# Patient Record
Sex: FEMALE | Race: WHITE | URBAN - METROPOLITAN AREA
[De-identification: names, ages, dates, MRNs, and addresses within clinical notes are randomized per-mention and may not be internally consistent; named-entity substitution may affect disease eponyms.]

---

## 2018-08-17 ENCOUNTER — OFFICE VISIT (OUTPATIENT)
Dept: PLASTIC SURGERY | Facility: CLINIC | Age: 73
End: 2018-08-17
Payer: COMMERCIAL

## 2018-08-17 VITALS — WEIGHT: 131 LBS | BODY MASS INDEX: 21.05 KG/M2 | HEIGHT: 66 IN

## 2018-08-17 DIAGNOSIS — L98.9 SKIN LESION: Primary | ICD-10-CM

## 2018-08-17 DIAGNOSIS — L98.9 SKIN LESIONS: ICD-10-CM

## 2018-08-17 PROCEDURE — 99202 OFFICE O/P NEW SF 15 MIN: CPT | Performed by: SURGERY

## 2018-08-17 PROCEDURE — 88305 TISSUE EXAM BY PATHOLOGIST: CPT | Performed by: PATHOLOGY

## 2018-08-17 RX ORDER — BUPROPION HYDROCHLORIDE 300 MG/1
300 TABLET ORAL DAILY
COMMUNITY

## 2018-08-17 RX ORDER — B-COMPLEX WITH VITAMIN C
TABLET ORAL
COMMUNITY

## 2018-08-17 RX ORDER — HYDROXYCHLOROQUINE SULFATE 200 MG/1
200 TABLET, FILM COATED ORAL 2 TIMES DAILY WITH MEALS
COMMUNITY

## 2018-08-17 RX ORDER — IPRATROPIUM BROMIDE 42 UG/1
2 SPRAY, METERED NASAL 4 TIMES DAILY
COMMUNITY

## 2018-08-17 RX ORDER — CHLORPHENIRAMINE MALEATE 4 MG/1
4 TABLET ORAL EVERY 6 HOURS PRN
COMMUNITY

## 2018-08-17 RX ORDER — RIBOFLAVIN (VITAMIN B2) 100 MG
100 TABLET ORAL DAILY
COMMUNITY

## 2018-08-17 RX ORDER — BETAMETHASONE DIPROPIONATE 0.5 MG/G
CREAM TOPICAL 2 TIMES DAILY
COMMUNITY

## 2018-08-17 RX ORDER — ANASTROZOLE 1 MG/1
1 TABLET ORAL DAILY
COMMUNITY

## 2018-08-17 RX ORDER — LUTEIN 10 MG
TABLET ORAL
COMMUNITY

## 2018-08-17 RX ORDER — ZEAXANTHIN 90 %
POWDER (GRAM) MISCELLANEOUS
COMMUNITY

## 2018-08-17 RX ORDER — UREA 10 %
1 LOTION (ML) TOPICAL DAILY
COMMUNITY

## 2018-08-17 NOTE — PROGRESS NOTES
Assessment/Plan:  Please see HPI  She has a persistent lesion on the right superolateral brow, is scaly, somewhat ulcerated, and is been present for several months  She also has a similar, smaller appearing area of the mid dorsum of the nose  She would prefer to avoid biopsying the nose, at least for the time being, she is interested in undergoing biopsy of the brow lesion  After injecting xylocaine with epinephrine, and prepping the skin with alcohol, The brow was biopsied utilizing a 3 mm punch, the area was closed with a 6 0 nylon  She will be seen in 1 week for suture removal  She understands that the toe should be biopsied if it does not heal, persists, or worsens  She will continue to follow-up with Dr Candido King  at [id number] [facility]  Diagnoses and all orders for this visit:    Skin lesion  -     Tissue Exam    Other orders  -     anastrozole (ARIMIDEX) 1 mg tablet; Take 1 mg by mouth daily  -     betamethasone dipropionate (DIPROSONE) 0 05 % cream; Apply topically 2 (two) times a day  -     buPROPion (WELLBUTRIN XL) 300 mg 24 hr tablet; Take 300 mg by mouth daily  -     calcium carbonate (OS-GO) 1250 (500 Ca) MG chewable tablet; Chew 1 tablet daily  -     chlorpheniramine (CHLOR-TRIMETON) 4 MG tablet; Take 4 mg by mouth every 6 (six) hours as needed for allergies  -     Cholecalciferol 65653 units CAPS; Take by mouth  -     DENOSUMAB SC; Inject under the skin  -     hydroxychloroquine (PLAQUENIL) 200 mg tablet; Take 200 mg by mouth 2 (two) times a day with meals  -     ipratropium (ATROVENT) 0 06 % nasal spray; 2 sprays into each nostril 4 (four) times a day  -     Ascorbic Acid (VITAMIN C) 100 MG tablet; Take 100 mg by mouth daily  -     vitamin E 100 UNIT capsule; Take 100 Units by mouth daily  -     Copper 5 MG CAPS;  Take by mouth  -     Lutein 10 MG TABS; Take by mouth  -     Zeaxanthin POWD; by Does not apply route  -     Zinc 100 MG TABS; Take by mouth          Subjective:  Lesions of superolateral right brow and nasal dorsum     Patient ID: Didi Mancini is a 81 y o  female  HPI she continues to be followed by Dr Dale Bush at  [address] [facility], is being treated for cutaneous lupus  She has lesions of the right superolateral brow and nasal dorsum which have persisted over several months she is here today to discuss options for biopsy  The following portions of the patient's history were reviewed and updated as appropriate: allergies, current medications, past family history, past medical history, past social history, past surgical history and problem list     Review of Systems   Constitutional: Negative for chills and fever  HENT: Negative for hearing loss  Eyes: Positive for visual disturbance  Negative for discharge  Respiratory: Negative for chest tightness and shortness of breath  Cardiovascular: Negative for chest pain and leg swelling  Gastrointestinal: Negative for blood in stool, constipation, diarrhea and nausea  Genitourinary: Negative for dysuria  Musculoskeletal: Negative for gait problem  Skin: Negative for rash  Allergic/Immunologic: Negative for immunocompromised state  Neurological: Negative for seizures and headaches  Hematological: Does not bruise/bleed easily  Psychiatric/Behavioral: Positive for dysphoric mood  The patient is not nervous/anxious  Objective:      Ht 5' 6" (1 676 m)   Wt 59 4 kg (131 lb)   BMI 21 14 kg/m²          Physical Exam   Constitutional: She is oriented to person, place, and time  She appears well-developed  HENT:   Head: Normocephalic and atraumatic  Eyes: Pupils are equal, round, and reactive to light  Neck: Normal range of motion  Pulmonary/Chest: Effort normal    Musculoskeletal: Normal range of motion  Neurological: She is alert and oriented to person, place, and time  Skin: Skin is warm     Pink, Scaly/crusted patch right superolateral brow, similar, smaller area left mid nasal dorsum Psychiatric: She has a normal mood and affect

## 2018-08-17 NOTE — LETTER
August 17, 2018     Vignesh Arita DO  [address]  [facility] [place]  [facility] [address]  [address] [facility]    Patient: Cornelio Gordon   YOB: 1945   Date of Visit: 8/17/2018       Dear Dr Ann Villaseñor: Thank you for referring Cristina Gabriel to me for evaluation  Below are my notes for this consultation  If you have questions, please do not hesitate to call me  I look forward to following your patient along with you  Sincerely,        Ashutosh Bhakta MD        CC: No Recipients  Ashutosh Bhakta MD  8/17/2018  2:53 PM  Sign at close encounter  Assessment/Plan:  Please see HPI  She has a persistent lesion on the right superolateral brow, is scaly, somewhat ulcerated, and is been present for several months  She also has a similar, smaller appearing area of the mid dorsum of the nose  She would prefer to avoid biopsying the nose, at least for the time being, she is interested in undergoing biopsy of the brow lesion  After injecting xylocaine with epinephrine, and prepping the skin with alcohol, The brow was biopsied utilizing a 3 mm punch, the area was closed with a 6 0 nylon  She will be seen in 1 week for suture removal  She understands that the toe should be biopsied if it does not heal, persists, or worsens  She will continue to follow-up with Dr Ann Villaseñor  at [address] [facility]  Diagnoses and all orders for this visit:    Skin lesion  -     Tissue Exam    Other orders  -     anastrozole (ARIMIDEX) 1 mg tablet; Take 1 mg by mouth daily  -     betamethasone dipropionate (DIPROSONE) 0 05 % cream; Apply topically 2 (two) times a day  -     buPROPion (WELLBUTRIN XL) 300 mg 24 hr tablet; Take 300 mg by mouth daily  -     calcium carbonate (OS-GO) 1250 (500 Ca) MG chewable tablet; Chew 1 tablet daily  -     chlorpheniramine (CHLOR-TRIMETON) 4 MG tablet; Take 4 mg by mouth every 6 (six) hours as needed for allergies  -     Cholecalciferol 15273 units CAPS;  Take by mouth  - DENOSUMAB SC; Inject under the skin  -     hydroxychloroquine (PLAQUENIL) 200 mg tablet; Take 200 mg by mouth 2 (two) times a day with meals  -     ipratropium (ATROVENT) 0 06 % nasal spray; 2 sprays into each nostril 4 (four) times a day  -     Ascorbic Acid (VITAMIN C) 100 MG tablet; Take 100 mg by mouth daily  -     vitamin E 100 UNIT capsule; Take 100 Units by mouth daily  -     Copper 5 MG CAPS; Take by mouth  -     Lutein 10 MG TABS; Take by mouth  -     Zeaxanthin POWD; by Does not apply route  -     Zinc 100 MG TABS; Take by mouth          Subjective:  Lesions of superolateral right brow and nasal dorsum     Patient ID: Anil Bah is a 81 y o  female  HPI she continues to be followed by Dr Albania Valera at  [id number] [place] [address] Dermatology, is being treated for cutaneous lupus  She has lesions of the right superolateral brow and nasal dorsum which have persisted over several months she is here today to discuss options for biopsy  The following portions of the patient's history were reviewed and updated as appropriate: allergies, current medications, past family history, past medical history, past social history, past surgical history and problem list     Review of Systems   Constitutional: Negative for chills and fever  HENT: Negative for hearing loss  Eyes: Positive for visual disturbance  Negative for discharge  Respiratory: Negative for chest tightness and shortness of breath  Cardiovascular: Negative for chest pain and leg swelling  Gastrointestinal: Negative for blood in stool, constipation, diarrhea and nausea  Genitourinary: Negative for dysuria  Musculoskeletal: Negative for gait problem  Skin: Negative for rash  Allergic/Immunologic: Negative for immunocompromised state  Neurological: Negative for seizures and headaches  Hematological: Does not bruise/bleed easily  Psychiatric/Behavioral: Positive for dysphoric mood  The patient is not nervous/anxious  Objective:      Ht 5' 6" (1 676 m)   Wt 59 4 kg (131 lb)   BMI 21 14 kg/m²           Physical Exam   Constitutional: She is oriented to person, place, and time  She appears well-developed  HENT:   Head: Normocephalic and atraumatic  Eyes: Pupils are equal, round, and reactive to light  Neck: Normal range of motion  Pulmonary/Chest: Effort normal    Musculoskeletal: Normal range of motion  Neurological: She is alert and oriented to person, place, and time  Skin: Skin is warm  Pink, Scaly/crusted patch right superolateral brow, similar, smaller area left mid nasal dorsum   Psychiatric: She has a normal mood and affect

## 2018-08-22 ENCOUNTER — OFFICE VISIT (OUTPATIENT)
Dept: PLASTIC SURGERY | Facility: CLINIC | Age: 73
End: 2018-08-22

## 2018-08-22 DIAGNOSIS — Z98.890 POST-OPERATIVE STATE: Primary | ICD-10-CM

## 2018-08-22 PROCEDURE — 99024 POSTOP FOLLOW-UP VISIT: CPT

## 2018-08-22 NOTE — PROGRESS NOTES
Patient presents for suture removal s/p biopsy of right superiolateral brow lesion 8/17/18  Site well healed  Suture removed  Aware that pathology results are not yet available  Instructed to call the office for results in one week if she has not heard from us  States she has appt with dermatologist to address nose lesion with topical chemotherapy if necessary  Post op scar care instructions reviewed and given in AVS  Encouraged to call with questions

## 2018-08-22 NOTE — PATIENT INSTRUCTIONS
Call in one week if you have not heard from us regarding your biopsy results  Post Op Scar Care Instructions:  -Massage silicone scar gel (ex, Biocorneum, [employer] Silicone Scar Gel, Scar Away) into incision twice daily for 3 months      -Wear SPF 30+ on incision at all times with any sun exposure  Call with any questions or concerns

## 2018-11-16 ENCOUNTER — OFFICE VISIT (OUTPATIENT)
Dept: PLASTIC SURGERY | Facility: CLINIC | Age: 73
End: 2018-11-16
Payer: COMMERCIAL

## 2018-11-16 VITALS — WEIGHT: 130.8 LBS | HEIGHT: 66 IN | BODY MASS INDEX: 21.02 KG/M2

## 2018-11-16 DIAGNOSIS — L98.9 SKIN LESION: Primary | ICD-10-CM

## 2018-11-16 PROCEDURE — 99214 OFFICE O/P EST MOD 30 MIN: CPT | Performed by: PHYSICIAN ASSISTANT

## 2018-11-16 PROCEDURE — 88305 TISSUE EXAM BY PATHOLOGIST: CPT | Performed by: PATHOLOGY

## 2018-11-16 PROCEDURE — 11100 PR BIOPSY OF SKIN LESION: CPT | Performed by: PHYSICIAN ASSISTANT

## 2018-11-16 NOTE — LETTER
November 16, 2018     Alfonso DO Jc  [address]  [facility]  [address]     Patient: Jimmy Bashir   YOB: 1945   Date of Visit: 11/16/2018       Dear Dr Dez Lozoya: Thank you for referring Marcella Wang to me for evaluation  Below are my notes for this consultation  If you have questions, please do not hesitate to call me  I look forward to following your patient along with you  Sincerely,        Karin High PA-C        CC: DO Karin Royal PA-C  11/16/2018 12:24 PM  Sign at close encounter  Biopsy  Date/Time: 11/16/2018 12:23 PM  Performed by: Koki Alvarez  Authorized by: Koki Alvarez     Procedure Details - Lesion Biopsy: Body area:  [address]    Head/neck location:  R eyebrow    Initial size (mm):  10    Malignancy: malignancy unknown      Destruction method: punch biopsy            Karin High PA-C  11/16/2018 12:23 PM  Sign at close encounter  Assessment/Plan:  Tushar Galvez is a pleasant 82-year-old female who presents for evaluation of a right eyebrow skin lesion  Please see HPI  She had this lesion previously treated by Dr Dez Lozoya and then it recurred  A biopsy was suggested  I performed a 2 mm punch biopsy in the office today  We will see her back for suture removal in 1 week and to review the pathology results  Diagnoses and all orders for this visit:    Skin lesion          Subjective:      Patient ID: Jimmy Bashir is a 81 y o  female  HPI   Tushar Galvez is a pleasant 82-year-old female who presents for evaluation of a right eyebrow skin lesion  She states that the area had been previously treated by the dermatologist   It recurred quite quickly  It was recommended that she have the area biopsied      The following portions of the patient's history were reviewed and updated as appropriate: allergies, current medications, past family history, past medical history, past social history, past surgical history and problem list     Review of Systems   HENT: Negative for hearing loss  Eyes: Negative for visual disturbance  Wears eyeglasses  Respiratory: Negative for shortness of breath  Cardiovascular: Negative for chest pain  Gastrointestinal: Negative for abdominal pain, blood in stool, constipation, diarrhea, nausea and vomiting  Genitourinary: Negative for hematuria  Musculoskeletal: Negative for gait problem  Neurological: Negative for seizures and headaches  Hematological: Does not bruise/bleed easily  Psychiatric/Behavioral: The patient is not nervous/anxious  Objective:      Ht 5' 6" (1 676 m)   Wt 59 3 kg (130 lb 12 8 oz)   BMI 21 11 kg/m²           Physical Exam   Constitutional: She is oriented to person, place, and time  She appears well-developed and well-nourished  No distress  HENT:   Head: Normocephalic and atraumatic  Eyes: Pupils are equal, round, and reactive to light  EOM are normal  No scleral icterus  Neck: Neck supple  No tracheal deviation present  No thyromegaly present  Cardiovascular: Normal rate and regular rhythm  Exam reveals no gallop and no friction rub  No murmur heard  Pulmonary/Chest: Effort normal and breath sounds normal  She has no wheezes  She has no rales  Abdominal: Soft  Bowel sounds are normal  She exhibits no distension  There is no tenderness  There is no rebound and no guarding  Musculoskeletal: Normal range of motion  Lymphadenopathy:     She has no cervical adenopathy  Neurological: She is alert and oriented to person, place, and time  No cranial nerve deficit  Skin:   Right mid eyebrow skin lesion with irregular borders and scaled in appearance measuring approximately 1 cm in length and 6mm wide  Please see photos  Psychiatric: She has a normal mood and affect

## 2018-11-16 NOTE — PROGRESS NOTES
Assessment/Plan:  Leandro Ling is a pleasant 27-year-old female who presents for evaluation of a right eyebrow skin lesion  Please see HPI  She had this lesion previously treated by Dr Nguyen Barger and then it recurred  A biopsy was suggested  I performed a 2 mm punch biopsy in the office today  We will see her back for suture removal in 1 week and to review the pathology results  Diagnoses and all orders for this visit:    Skin lesion          Subjective:      Patient ID: Vani Ruiz is a 81 y o  female  HPI   Leandro Ling is a pleasant 27-year-old female who presents for evaluation of a right eyebrow skin lesion  She states that the area had been previously treated by the dermatologist   It recurred quite quickly  It was recommended that she have the area biopsied  The following portions of the patient's history were reviewed and updated as appropriate: allergies, current medications, past family history, past medical history, past social history, past surgical history and problem list     Review of Systems   HENT: Negative for hearing loss  Eyes: Negative for visual disturbance  Wears eyeglasses  Respiratory: Negative for shortness of breath  Cardiovascular: Negative for chest pain  Gastrointestinal: Negative for abdominal pain, blood in stool, constipation, diarrhea, nausea and vomiting  Genitourinary: Negative for hematuria  Musculoskeletal: Negative for gait problem  Neurological: Negative for seizures and headaches  Hematological: Does not bruise/bleed easily  Psychiatric/Behavioral: The patient is not nervous/anxious  Objective:      Ht 5' 6" (1 676 m)   Wt 59 3 kg (130 lb 12 8 oz)   BMI 21 11 kg/m²          Physical Exam   Constitutional: She is oriented to person, place, and time  She appears well-developed and well-nourished  No distress  HENT:   Head: Normocephalic and atraumatic  Eyes: Pupils are equal, round, and reactive to light   EOM are normal  No scleral icterus  Neck: Neck supple  No tracheal deviation present  No thyromegaly present  Cardiovascular: Normal rate and regular rhythm  Exam reveals no gallop and no friction rub  No murmur heard  Pulmonary/Chest: Effort normal and breath sounds normal  She has no wheezes  She has no rales  Abdominal: Soft  Bowel sounds are normal  She exhibits no distension  There is no tenderness  There is no rebound and no guarding  Musculoskeletal: Normal range of motion  Lymphadenopathy:     She has no cervical adenopathy  Neurological: She is alert and oriented to person, place, and time  No cranial nerve deficit  Skin:   Right mid eyebrow skin lesion with irregular borders and scaled in appearance measuring approximately 1 cm in length and 6mm wide  Please see photos  Psychiatric: She has a normal mood and affect

## 2018-11-16 NOTE — PROGRESS NOTES
Biopsy  Date/Time: 11/16/2018 12:23 PM  Performed by: Arabella Matthew  Authorized by: Arabella Matthew     Procedure Details - Lesion Biopsy:      Body area:  [address]    Head/neck location:  R eyebrow    Initial size (mm):  10    Malignancy: malignancy unknown      Destruction method: punch biopsy

## 2018-11-26 ENCOUNTER — OFFICE VISIT (OUTPATIENT)
Dept: PLASTIC SURGERY | Facility: CLINIC | Age: 73
End: 2018-11-26

## 2018-11-26 DIAGNOSIS — Z98.890 POST-OPERATIVE STATE: Primary | ICD-10-CM

## 2018-11-26 PROCEDURE — 99024 POSTOP FOLLOW-UP VISIT: CPT

## 2018-11-26 NOTE — PATIENT INSTRUCTIONS
Follow up with dermatologist regarding topical management of lesion  Pathology report was faxed to Dr Esa Hutchinson

## 2018-11-26 NOTE — PROGRESS NOTES
Patient presents for suture removal s/p right eyebrow lesion biopsy 11/16/18  Suture removed  Reviewed with patient that pathology showed an actinic keratosis which was not malignant but does require treatment  States she has had them in the past and will follow up with Dr Balbir Brandt, her derm, for treatment with topical  Pathology faxed to Dr Balbir Brandt  Will follow up as needed  Encouraged to call with questions